# Patient Record
(demographics unavailable — no encounter records)

---

## 2025-01-15 NOTE — PROCEDURE
[Topical Lidocaine] : topical lidocaine [Oxymetazoline HCl] : oxymetazoline HCl [Flexible Endoscope] : examined with the flexible endoscope [Congested] : congested [Deviated to the Lt] : deviated to the left [Nasal Septum] : no lesions [Nasal Septum Mucosa Bleeding] : no bleeding [] : no lesions present [Normal] : the paranasal sinuses had no abnormalities [FreeTextEntry6] : done to eval for npx mass, omu patency, sinusitis, polyps findings: L DNS, left nasal valve obstruction, b turbinate hypertrophy

## 2025-01-15 NOTE — PHYSICAL EXAM
[Nasal Endoscopy Performed] : nasal endoscopy was performed, see procedure section for findings [] : septum deviated to the left [Midline] : trachea located in midline position [Normal] : tympanic membranes are normal in both ears [de-identified] : hypertrophy, left nasal valve obstruction [de-identified] : 3-4 cm uvula, possibly touching uvula, thick, elongated, membranous

## 2025-05-20 NOTE — PHYSICAL EXAM
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
## 2025-05-20 NOTE — ASSESSMENT
[FreeTextEntry1] : 1. snoring -sleep study: mild sleep apnea (AHI = 6.8, CMS 4%) with a shayna oxygen sat of 86% -results reviewed with patient -sleep with l side down -discussed risks/benefits/alternatives of uvuloplasty -may plan for future L nasal valve repair depending on results of uvuloplasty -he wished to proceed - notified  2. hearing loss -audio exam: hf snhl AU , type A tymps AU -results reviewed with patient -noise protection -repeat  in x1 year

## 2025-06-03 NOTE — PHYSICAL EXAM
[Midline] : trachea located in midline position [Normal] : assessment of respiratory effort is normal [de-identified] : elongated uvula

## 2025-06-03 NOTE — END OF VISIT
[FreeTextEntry3] : I, Dr. Yuen, personally performed the evaluation and management (E/M) services for this established patient who presents today with (a) new problem(s)/exacerbation of (an) existing condition(s).  That E/M includes conducting the examination, assessing all new/exacerbated conditions, and establishing a new plan of care.  Today, my physician assistant, Torie Albarran, was here to observe my evaluation and management services for this new problem/exacerbated condition to be followed going forward.

## 2025-06-03 NOTE — ASSESSMENT
[FreeTextEntry1] : 1. KIT -sleep study: mild sleep apnea (AHI = 6.8, CMS 4%) with a shayna oxygen sat of 86% -results reviewed with patient -uvuloplasty performed without complication -post op care discussed, worksheet provided -ibuprofen, lidocaine viscous, tylenol #3, docusate (for constipation d/t opioid use) -rtc in x3-4 weeks for post op check -istop: : [id number]

## 2025-06-03 NOTE — HISTORY OF PRESENT ILLNESS
[de-identified] : 3 week follow up visit for this 52 y/o M here for uvuloplasty for tx of mild sleep apnea (AHI: 6.8). He overall feells well

## 2025-06-03 NOTE — PROCEDURE
[FreeTextEntry1] : uvulectomy [FreeTextEntry2] : obstructive sleep apnea, elongated uvula [FreeTextEntry3] : After informed verbal consent was obtained, the patient was prepped and draped under usual sterile technique. The oral cavity was topically anesthetized with 4% lidocaine spray. The patient was given safety goggles to wear. 2 cc of 2% lidocaine with epinephrine was injected superior to uvula for further local anesthesia. The tongue was gently pressed down with a tongue blade and uvula was removed using a diode laser in contact mode. The uvula was placed in a sterile cup filled with formalin, labeled with patients demographics to be sent to pathology. The palatopharyngeal arch proximate to uvula was stiffened using diode laser in contact mode. Patient was then given cold water to gargle and spit out secretions.  The patient was stable and well during and after the procedure. Post op care instructions discussed, worksheet provided.

## 2025-06-24 NOTE — HISTORY OF PRESENT ILLNESS
[de-identified] : 3 week post op visit for this 54 y/o M s/p uvuloplasty for tx of mild sleep apnea and snoring. He states that wife noted that snoring is less intense. Reports that he is still breathing through his mouth at night. Noted that he had pain for a couple of days, with return of pain x2 weeks later. He is able to tolerate PO. Denies fever, bleeding.

## 2025-06-24 NOTE — ASSESSMENT
[FreeTextEntry1] : 1. post op check -3 weeks post op check -doing well -no secondary infection noted -will explore L nasal valve repair in the fall -RTC in x3-4 mo

## 2025-06-24 NOTE — PHYSICAL EXAM
[Midline] : trachea located in midline position [Normal] : no rashes [de-identified] : post uvuloplasty, white granulation tissue noted, healing well